# Patient Record
Sex: FEMALE | NOT HISPANIC OR LATINO
[De-identification: names, ages, dates, MRNs, and addresses within clinical notes are randomized per-mention and may not be internally consistent; named-entity substitution may affect disease eponyms.]

---

## 2017-07-27 ENCOUNTER — RESULT REVIEW (OUTPATIENT)
Age: 52
End: 2017-07-27

## 2017-07-28 ENCOUNTER — RESULT REVIEW (OUTPATIENT)
Age: 52
End: 2017-07-28

## 2018-08-09 ENCOUNTER — RESULT REVIEW (OUTPATIENT)
Age: 53
End: 2018-08-09

## 2018-10-04 ENCOUNTER — OUTPATIENT (OUTPATIENT)
Dept: OUTPATIENT SERVICES | Facility: HOSPITAL | Age: 53
LOS: 1 days | End: 2018-10-04
Payer: SELF-PAY

## 2018-10-04 VITALS
OXYGEN SATURATION: 99 % | TEMPERATURE: 98 F | DIASTOLIC BLOOD PRESSURE: 70 MMHG | RESPIRATION RATE: 14 BRPM | WEIGHT: 194.01 LBS | HEIGHT: 66.5 IN | SYSTOLIC BLOOD PRESSURE: 112 MMHG | HEART RATE: 59 BPM

## 2018-10-04 DIAGNOSIS — Z41.1 ENCOUNTER FOR COSMETIC SURGERY: ICD-10-CM

## 2018-10-04 DIAGNOSIS — E03.9 HYPOTHYROIDISM, UNSPECIFIED: ICD-10-CM

## 2018-10-04 DIAGNOSIS — Z90.710 ACQUIRED ABSENCE OF BOTH CERVIX AND UTERUS: Chronic | ICD-10-CM

## 2018-10-04 LAB
APPEARANCE UR: CLEAR — SIGNIFICANT CHANGE UP
BACTERIA # UR AUTO: NEGATIVE — SIGNIFICANT CHANGE UP
BILIRUB UR-MCNC: NEGATIVE — SIGNIFICANT CHANGE UP
BLD GP AB SCN SERPL QL: NEGATIVE — SIGNIFICANT CHANGE UP
BLOOD UR QL VISUAL: SIGNIFICANT CHANGE UP
BUN SERPL-MCNC: 14 MG/DL — SIGNIFICANT CHANGE UP (ref 7–23)
CALCIUM SERPL-MCNC: 9.5 MG/DL — SIGNIFICANT CHANGE UP (ref 8.4–10.5)
CHLORIDE SERPL-SCNC: 103 MMOL/L — SIGNIFICANT CHANGE UP (ref 98–107)
CO2 SERPL-SCNC: 28 MMOL/L — SIGNIFICANT CHANGE UP (ref 22–31)
COLOR SPEC: YELLOW — SIGNIFICANT CHANGE UP
CREAT SERPL-MCNC: 0.78 MG/DL — SIGNIFICANT CHANGE UP (ref 0.5–1.3)
GLUCOSE SERPL-MCNC: 103 MG/DL — HIGH (ref 70–99)
GLUCOSE UR-MCNC: NEGATIVE — SIGNIFICANT CHANGE UP
HCT VFR BLD CALC: 40.9 % — SIGNIFICANT CHANGE UP (ref 34.5–45)
HGB BLD-MCNC: 13.2 G/DL — SIGNIFICANT CHANGE UP (ref 11.5–15.5)
HYALINE CASTS # UR AUTO: SIGNIFICANT CHANGE UP
KETONES UR-MCNC: NEGATIVE — SIGNIFICANT CHANGE UP
LEUKOCYTE ESTERASE UR-ACNC: NEGATIVE — SIGNIFICANT CHANGE UP
MCHC RBC-ENTMCNC: 32.3 % — SIGNIFICANT CHANGE UP (ref 32–36)
MCHC RBC-ENTMCNC: 32.3 PG — SIGNIFICANT CHANGE UP (ref 27–34)
MCV RBC AUTO: 100 FL — SIGNIFICANT CHANGE UP (ref 80–100)
NITRITE UR-MCNC: NEGATIVE — SIGNIFICANT CHANGE UP
NRBC # FLD: 0 — SIGNIFICANT CHANGE UP
PH UR: 6 — SIGNIFICANT CHANGE UP (ref 5–8)
PLATELET # BLD AUTO: 187 K/UL — SIGNIFICANT CHANGE UP (ref 150–400)
PMV BLD: 12 FL — SIGNIFICANT CHANGE UP (ref 7–13)
POTASSIUM SERPL-MCNC: 3.4 MMOL/L — LOW (ref 3.5–5.3)
POTASSIUM SERPL-SCNC: 3.4 MMOL/L — LOW (ref 3.5–5.3)
PROT UR-MCNC: 20 — SIGNIFICANT CHANGE UP
RBC # BLD: 4.09 M/UL — SIGNIFICANT CHANGE UP (ref 3.8–5.2)
RBC # FLD: 12.1 % — SIGNIFICANT CHANGE UP (ref 10.3–14.5)
RBC CASTS # UR COMP ASSIST: HIGH (ref 0–?)
RH IG SCN BLD-IMP: POSITIVE — SIGNIFICANT CHANGE UP
SODIUM SERPL-SCNC: 142 MMOL/L — SIGNIFICANT CHANGE UP (ref 135–145)
SP GR SPEC: 1.03 — SIGNIFICANT CHANGE UP (ref 1–1.04)
SQUAMOUS # UR AUTO: SIGNIFICANT CHANGE UP
UROBILINOGEN FLD QL: SIGNIFICANT CHANGE UP
WBC # BLD: 5.07 K/UL — SIGNIFICANT CHANGE UP (ref 3.8–10.5)
WBC # FLD AUTO: 5.07 K/UL — SIGNIFICANT CHANGE UP (ref 3.8–10.5)
WBC UR QL: SIGNIFICANT CHANGE UP (ref 0–?)

## 2018-10-04 PROCEDURE — 93010 ELECTROCARDIOGRAM REPORT: CPT

## 2018-10-04 NOTE — H&P PST ADULT - NEGATIVE OPHTHALMOLOGIC SYMPTOMS
no diplopia/no discharge L/no photophobia/no pain R/no irritation R/no loss of vision L/no blurred vision L/no blurred vision R/no discharge R/no irritation L/no pain L

## 2018-10-04 NOTE — H&P PST ADULT - NEGATIVE MUSCULOSKELETAL SYMPTOMS
no muscle cramps/no arthritis/no neck pain/no leg pain R/no leg pain L/no stiffness/no arm pain L/no arm pain R/no joint swelling/no muscle weakness

## 2018-10-04 NOTE — H&P PST ADULT - PROBLEM SELECTOR PLAN 1
Patient is scheduled for  Abdominoplasty on 10/18/2018.  Preop instructions, pepcid, surgical scrub provided. Pt stated understanding.    Pending medical evaluation per surgeon, pending comparison EKG, patient has an appointment with Dr. Bruno 10/11/2018.   Pending Labs.

## 2018-10-04 NOTE — H&P PST ADULT - PROBLEM SELECTOR PLAN 2
Patient was instructed to take Synthroid and Liothyronine in the AM of surgery with a sip of water.   Pending last endocrinology note- ( Patient reports -Synthroid and Liothyronine dose changes every 6 months, depending on levels) Patient had an appointment on 9/2018.

## 2018-10-04 NOTE — H&P PST ADULT - NEGATIVE GENERAL GENITOURINARY SYMPTOMS
no flank pain L/normal urinary frequency/no nocturia/no flank pain R/no bladder infections/no urinary hesitancy/no dysuria

## 2018-10-04 NOTE — H&P PST ADULT - CONSTITUTIONAL DETAILS
obese/well-developed/no distress/well-groomed well-developed/well-nourished/well-groomed/no distress

## 2018-10-04 NOTE — H&P PST ADULT - NEGATIVE ENMT SYMPTOMS
no vertigo/no nasal discharge/no gum bleeding/no throat pain/no dysphagia/no tinnitus/no dry mouth/no ear pain/no nose bleeds/no hearing difficulty/no abnormal taste sensation no dysphagia/no abnormal taste sensation/no dry mouth/no nasal congestion/no gum bleeding/no throat pain/no vertigo/no tinnitus/no nasal discharge/no post-nasal discharge/no nose bleeds/no ear pain/no hearing difficulty

## 2018-10-04 NOTE — H&P PST ADULT - NEGATIVE GENERAL SYMPTOMS
no fatigue/no fever/no chills/no weight gain/no sweating/no polyphagia no polydipsia/no polyphagia/no polyuria/no fatigue/no weight gain/no fever/no chills/no sweating/no anorexia

## 2018-10-04 NOTE — H&P PST ADULT - PMH
Dyslipidemia    Encounter for cosmetic surgery    History of scarlet fever  as a child  Hypothyroidism    Obesity (BMI 30-39.9)    Torus palatinus    Uterine leiomyoma

## 2018-10-04 NOTE — H&P PST ADULT - MUSCULOSKELETAL
details… No joint pain, swelling or deformity; no limitation of movement detailed exam no calf tenderness/normal strength/no joint swelling/no joint erythema/no joint warmth/ROM intact

## 2018-10-04 NOTE — H&P PST ADULT - ENDOCRINE COMMENTS
h/o Hypothyroidism- Patient sees her endocrinologist every 6 months for an evaluation and adjustment of synthroid and Liothyronine

## 2018-10-04 NOTE — H&P PST ADULT - RS GEN PE MLT RESP DETAILS PC
no wheezes/breath sounds equal/respirations non-labored/airway patent/clear to auscultation bilaterally/no chest wall tenderness/no rhonchi/no intercostal retractions/no rales/good air movement no chest wall tenderness/respirations non-labored/no rales/no wheezes/breath sounds equal/airway patent/clear to auscultation bilaterally/no rhonchi/good air movement

## 2018-10-04 NOTE — H&P PST ADULT - LYMPHATIC
anterior cervical R/posterior cervical L/supraclavicular R/posterior cervical R/supraclavicular L/anterior cervical L

## 2018-10-04 NOTE — H&P PST ADULT - GASTROINTESTINAL DETAILS
nontender/bowel sounds normal no masses palpable/bowel sounds normal/soft/no distention/no guarding/nontender

## 2018-10-04 NOTE — H&P PST ADULT - NEGATIVE NEUROLOGICAL SYMPTOMS
no generalized seizures/no difficulty walking/no loss of sensation/no tremors/no facial palsy/no transient paralysis/no weakness/no paresthesias/no vertigo/no focal seizures/no syncope

## 2018-10-04 NOTE — H&P PST ADULT - ASSESSMENT
45 y/o female presents with PMH: uterine leiomyoma Encounter for cosmetic surgery, Patient is scheduled for  Abdominoplasty on 10/18/2018.

## 2018-10-04 NOTE — H&P PST ADULT - HISTORY OF PRESENT ILLNESS
52 year old female who presents to presurgical testing for an evaluation for a scheduled abdominoplasty scheduled for 10/18/2018 with Dr. Brenner.

## 2018-10-18 ENCOUNTER — INPATIENT (INPATIENT)
Facility: HOSPITAL | Age: 53
LOS: 0 days | Discharge: HOME | End: 2018-10-19
Attending: SURGERY | Admitting: SURGERY
Payer: SELF-PAY

## 2018-10-18 VITALS
TEMPERATURE: 98 F | DIASTOLIC BLOOD PRESSURE: 87 MMHG | OXYGEN SATURATION: 99 % | RESPIRATION RATE: 14 BRPM | HEART RATE: 56 BPM | WEIGHT: 194.01 LBS | HEIGHT: 66.5 IN | SYSTOLIC BLOOD PRESSURE: 130 MMHG

## 2018-10-18 DIAGNOSIS — Z90.710 ACQUIRED ABSENCE OF BOTH CERVIX AND UTERUS: Chronic | ICD-10-CM

## 2018-10-18 DIAGNOSIS — Z41.1 ENCOUNTER FOR COSMETIC SURGERY: ICD-10-CM

## 2018-10-18 LAB — RH IG SCN BLD-IMP: POSITIVE — SIGNIFICANT CHANGE UP

## 2018-10-18 PROCEDURE — 15830 EXC EXCESSIVE SKIN ABDOMEN: CPT

## 2018-10-18 RX ORDER — ACETAMINOPHEN 500 MG
975 TABLET ORAL EVERY 6 HOURS
Qty: 0 | Refills: 0 | Status: DISCONTINUED | OUTPATIENT
Start: 2018-10-19 | End: 2018-10-19

## 2018-10-18 RX ORDER — HYDROMORPHONE HYDROCHLORIDE 2 MG/ML
1 INJECTION INTRAMUSCULAR; INTRAVENOUS; SUBCUTANEOUS
Qty: 0 | Refills: 0 | Status: DISCONTINUED | OUTPATIENT
Start: 2018-10-18 | End: 2018-10-19

## 2018-10-18 RX ORDER — DIAZEPAM 5 MG
5 TABLET ORAL EVERY 6 HOURS
Qty: 0 | Refills: 0 | Status: DISCONTINUED | OUTPATIENT
Start: 2018-10-18 | End: 2018-10-19

## 2018-10-18 RX ORDER — ONDANSETRON 8 MG/1
4 TABLET, FILM COATED ORAL EVERY 6 HOURS
Qty: 0 | Refills: 0 | Status: DISCONTINUED | OUTPATIENT
Start: 2018-10-18 | End: 2018-10-19

## 2018-10-18 RX ORDER — SODIUM CHLORIDE 9 MG/ML
1000 INJECTION, SOLUTION INTRAVENOUS
Qty: 0 | Refills: 0 | Status: DISCONTINUED | OUTPATIENT
Start: 2018-10-18 | End: 2018-10-18

## 2018-10-18 RX ORDER — ONDANSETRON 8 MG/1
4 TABLET, FILM COATED ORAL ONCE
Qty: 0 | Refills: 0 | Status: DISCONTINUED | OUTPATIENT
Start: 2018-10-18 | End: 2018-10-18

## 2018-10-18 RX ORDER — ACETAMINOPHEN 500 MG
1000 TABLET ORAL ONCE
Qty: 0 | Refills: 0 | Status: COMPLETED | OUTPATIENT
Start: 2018-10-19 | End: 2018-10-19

## 2018-10-18 RX ORDER — SODIUM CHLORIDE 9 MG/ML
1000 INJECTION, SOLUTION INTRAVENOUS
Qty: 0 | Refills: 0 | Status: DISCONTINUED | OUTPATIENT
Start: 2018-10-18 | End: 2018-10-19

## 2018-10-18 RX ORDER — CEFAZOLIN SODIUM 1 G
1000 VIAL (EA) INJECTION EVERY 8 HOURS
Qty: 0 | Refills: 0 | Status: DISCONTINUED | OUTPATIENT
Start: 2018-10-18 | End: 2018-10-19

## 2018-10-18 RX ORDER — OXYCODONE HYDROCHLORIDE 5 MG/1
5 TABLET ORAL EVERY 4 HOURS
Qty: 0 | Refills: 0 | Status: DISCONTINUED | OUTPATIENT
Start: 2018-10-18 | End: 2018-10-19

## 2018-10-18 RX ORDER — ENOXAPARIN SODIUM 100 MG/ML
40 INJECTION SUBCUTANEOUS EVERY 24 HOURS
Qty: 0 | Refills: 0 | Status: DISCONTINUED | OUTPATIENT
Start: 2018-10-19 | End: 2018-10-19

## 2018-10-18 RX ORDER — LABETALOL HCL 100 MG
10 TABLET ORAL EVERY 6 HOURS
Qty: 0 | Refills: 0 | Status: DISCONTINUED | OUTPATIENT
Start: 2018-10-18 | End: 2018-10-19

## 2018-10-18 RX ORDER — LEVOTHYROXINE SODIUM 125 MCG
1 TABLET ORAL
Qty: 0 | Refills: 0 | COMMUNITY

## 2018-10-18 RX ORDER — LIOTHYRONINE SODIUM 25 UG/1
1 TABLET ORAL
Qty: 0 | Refills: 0 | COMMUNITY

## 2018-10-18 RX ORDER — OXYCODONE HYDROCHLORIDE 5 MG/1
10 TABLET ORAL EVERY 4 HOURS
Qty: 0 | Refills: 0 | Status: DISCONTINUED | OUTPATIENT
Start: 2018-10-18 | End: 2018-10-19

## 2018-10-18 RX ORDER — ATORVASTATIN CALCIUM 80 MG/1
1 TABLET, FILM COATED ORAL
Qty: 0 | Refills: 0 | COMMUNITY

## 2018-10-18 RX ADMIN — SODIUM CHLORIDE 50 MILLILITER(S): 9 INJECTION, SOLUTION INTRAVENOUS at 23:39

## 2018-10-18 NOTE — BRIEF OPERATIVE NOTE - PROCEDURE
<<-----Click on this checkbox to enter Procedure Abdominoplasty with liposuction  10/18/2018    Active  ADRIANAS

## 2018-10-19 ENCOUNTER — TRANSCRIPTION ENCOUNTER (OUTPATIENT)
Age: 53
End: 2018-10-19

## 2018-10-19 VITALS
OXYGEN SATURATION: 97 % | RESPIRATION RATE: 16 BRPM | DIASTOLIC BLOOD PRESSURE: 66 MMHG | SYSTOLIC BLOOD PRESSURE: 112 MMHG | TEMPERATURE: 99 F | HEART RATE: 73 BPM

## 2018-10-19 RX ORDER — CEPHALEXIN 500 MG
1 CAPSULE ORAL
Qty: 0 | Refills: 0 | COMMUNITY

## 2018-10-19 RX ORDER — ENOXAPARIN SODIUM 100 MG/ML
40 INJECTION SUBCUTANEOUS
Qty: 5 | Refills: 0 | OUTPATIENT
Start: 2018-10-19 | End: 2018-10-23

## 2018-10-19 RX ORDER — ENOXAPARIN SODIUM 100 MG/ML
40 INJECTION SUBCUTANEOUS
Qty: 2 | Refills: 0 | OUTPATIENT
Start: 2018-10-19 | End: 2018-10-23

## 2018-10-19 RX ORDER — DIAZEPAM 5 MG
1 TABLET ORAL
Qty: 0 | Refills: 0 | COMMUNITY
Start: 2018-10-19

## 2018-10-19 RX ADMIN — ENOXAPARIN SODIUM 40 MILLIGRAM(S): 100 INJECTION SUBCUTANEOUS at 05:52

## 2018-10-19 RX ADMIN — Medication 975 MILLIGRAM(S): at 15:28

## 2018-10-19 RX ADMIN — Medication 400 MILLIGRAM(S): at 04:00

## 2018-10-19 RX ADMIN — OXYCODONE HYDROCHLORIDE 10 MILLIGRAM(S): 5 TABLET ORAL at 01:43

## 2018-10-19 RX ADMIN — Medication 100 MILLIGRAM(S): at 14:04

## 2018-10-19 RX ADMIN — Medication 5 MILLIGRAM(S): at 15:28

## 2018-10-19 RX ADMIN — Medication 5 MILLIGRAM(S): at 00:36

## 2018-10-19 RX ADMIN — HYDROMORPHONE HYDROCHLORIDE 1 MILLIGRAM(S): 2 INJECTION INTRAMUSCULAR; INTRAVENOUS; SUBCUTANEOUS at 00:37

## 2018-10-19 RX ADMIN — OXYCODONE HYDROCHLORIDE 10 MILLIGRAM(S): 5 TABLET ORAL at 02:43

## 2018-10-19 RX ADMIN — Medication 975 MILLIGRAM(S): at 09:00

## 2018-10-19 RX ADMIN — OXYCODONE HYDROCHLORIDE 10 MILLIGRAM(S): 5 TABLET ORAL at 17:46

## 2018-10-19 RX ADMIN — HYDROMORPHONE HYDROCHLORIDE 1 MILLIGRAM(S): 2 INJECTION INTRAMUSCULAR; INTRAVENOUS; SUBCUTANEOUS at 00:58

## 2018-10-19 RX ADMIN — OXYCODONE HYDROCHLORIDE 5 MILLIGRAM(S): 5 TABLET ORAL at 09:06

## 2018-10-19 RX ADMIN — Medication 100 MILLIGRAM(S): at 05:52

## 2018-10-19 RX ADMIN — Medication 975 MILLIGRAM(S): at 09:01

## 2018-10-19 RX ADMIN — OXYCODONE HYDROCHLORIDE 5 MILLIGRAM(S): 5 TABLET ORAL at 14:30

## 2018-10-19 RX ADMIN — OXYCODONE HYDROCHLORIDE 10 MILLIGRAM(S): 5 TABLET ORAL at 17:33

## 2018-10-19 RX ADMIN — OXYCODONE HYDROCHLORIDE 5 MILLIGRAM(S): 5 TABLET ORAL at 10:10

## 2018-10-19 RX ADMIN — OXYCODONE HYDROCHLORIDE 5 MILLIGRAM(S): 5 TABLET ORAL at 13:16

## 2018-10-19 RX ADMIN — Medication 5 MILLIGRAM(S): at 09:38

## 2018-10-19 NOTE — DISCHARGE NOTE ADULT - NS AS DC FOLLOWUP STROKE INST
carenotes on surgical procedure, d/c medications carenotes on surgical procedure, d/c medications NOHEMI drains

## 2018-10-19 NOTE — DISCHARGE NOTE ADULT - HOSPITAL COURSE
52y Female was admitted to [facility] on 10-18-18. The patient had a diastasis recti and required a  Abdominoplasty with liposuction   to be performed in the OR. Post operative the patient was sent to the PACU, the patient was hemodynamically stable and sent to the floor. The patient's pain was controlled by IV pain medications transitioned to po narcotics. The patient was advanced to regular diet and tolerated it well. The patient was hemodynamically stable and placed on home medications. The patient was told to follow up with Dr. WOLF Brenner in 1-2 weeks and had no other issues.

## 2018-10-19 NOTE — DISCHARGE NOTE ADULT - CARE PLAN
Principal Discharge DX:	Encounter for cosmetic surgery  Goal:	promotion of healing  Assessment and plan of treatment:	DRAINS: You will be discharged with NOHEMI drains. You will need to empty them and record outputs accurately. This will be taught to you by the nursing staff. Please do not remove the NOHEMI drains. They will be removed in the office. Please bring to the office accurate records of output.     Your abdomen will feel tight, please walk hunched in the beginning and continue wearing your abdominal binder. keep your dressing on until follow up.

## 2018-10-19 NOTE — DISCHARGE NOTE ADULT - MEDICATION SUMMARY - MEDICATIONS TO TAKE
I will START or STAY ON the medications listed below when I get home from the hospital:    Norco 5 mg-325 mg oral tablet  -- 1 tab(s) by mouth every 6 hours  -- Indication: For pain    diazePAM 5 mg oral tablet  -- 1 tab(s) by mouth every 6 hours, As needed, Muscle Spasm Pain  -- Indication: For muscle spasm    atorvastatin 20 mg oral tablet  -- 1 tab(s) by mouth once a day  -- Indication: For Home med    cephalexin 500 mg oral capsule  -- 1 cap(s) by mouth every 12 hours  -- Indication: For abdominoplasty abx    liothyronine 5 mcg oral tablet  -- 1 tab(s) by mouth once a day  -- Indication: For Home med    Synthroid 100 mcg (0.1 mg) oral tablet  -- 1 tab(s) by mouth once a day  -- Indication: For Home med I will START or STAY ON the medications listed below when I get home from the hospital:    Norco 5 mg-325 mg oral tablet  -- 1 tab(s) by mouth every 6 hours  -- Indication: For pain    enoxaparin 40 mg/0.4 mL injectable solution  -- 40 milligram(s) subcutaneously once a day   -- It is very important that you take or use this exactly as directed.  Do not skip doses or discontinue unless directed by your doctor.    -- Indication: For DVT ppx    diazePAM 5 mg oral tablet  -- 1 tab(s) by mouth every 6 hours, As needed, Muscle Spasm Pain  -- Indication: For muscle spasm    atorvastatin 20 mg oral tablet  -- 1 tab(s) by mouth once a day  -- Indication: For Home med    cephalexin 500 mg oral capsule  -- 1 cap(s) by mouth every 12 hours  -- Indication: For abdominoplasty abx    liothyronine 5 mcg oral tablet  -- 1 tab(s) by mouth once a day  -- Indication: For Home med    Synthroid 100 mcg (0.1 mg) oral tablet  -- 1 tab(s) by mouth once a day  -- Indication: For Home med

## 2018-10-19 NOTE — PROGRESS NOTE ADULT - SUBJECTIVE AND OBJECTIVE BOX
ANESTHESIA POSTOP CHECK    52y Female POSTOP DAY 1     No COMPLAINTS    NO APPARENT ANESTHESIA COMPLICATIONS

## 2018-10-19 NOTE — DISCHARGE NOTE ADULT - CONDITIONS AT DISCHARGE
stable, abdominal binder in place, 3 abdominal JPs to ss in place with serosang drainage, tolerating diet, voiding well, oob with assist

## 2018-10-19 NOTE — DISCHARGE NOTE ADULT - PLAN OF CARE
promotion of healing DRAINS: You will be discharged with NOHEMI drains. You will need to empty them and record outputs accurately. This will be taught to you by the nursing staff. Please do not remove the NOHEMI drains. They will be removed in the office. Please bring to the office accurate records of output.     Your abdomen will feel tight, please walk hunched in the beginning and continue wearing your abdominal binder. keep your dressing on until follow up.

## 2018-10-19 NOTE — DISCHARGE NOTE ADULT - CARE PROVIDER_API CALL
Lenny Brenner (MD), Plastic Surgery  [address]  [address]  [place] [address]  Phone: [phone]  Fax: [phone]

## 2018-10-19 NOTE — PROGRESS NOTE ADULT - SUBJECTIVE AND OBJECTIVE BOX
Plastic Surgery  Daily Progress Note     Subjective/24 Hour Events: No acute events overnight, pt doing well this morning    Objective:    Vitals:  T(C): 37.1 (10-19-18 @ 09:34), Max: 37.1 (10-19-18 @ 09:34)  HR: 70 (10-19-18 @ 09:34) (56 - 79)  BP: 110/60 (10-19-18 @ 09:34) (99/59 - 130/87)  RR: 16 (10-19-18 @ 09:34) (11 - 21)  SpO2: 100% (10-19-18 @ 09:34) (94% - 100%)    I/O:     10-18-18 @ 07:01  -  10-19-18 @ 07:00  --------------------------------------------------------  IN: 175 mL / OUT: 510 mL / NET: -335 mL    10-19-18 @ 07:01  -  10-19-18 @ 11:53  --------------------------------------------------------  IN: 0 mL / OUT: 47.5 mL / NET: -47.5 mL      Physical Exam:   - General: NAD  - Abd: Binder in place. [place] in place, C/D/I, drains in place

## 2018-10-19 NOTE — DISCHARGE NOTE ADULT - PATIENT PORTAL LINK FT
You can access the [email or web address] Patient Portal, offered by [facility], by registering with the following website: [email or web address]

## 2018-10-19 NOTE — DISCHARGE NOTE ADULT - INSTRUCTIONS
Notify Dr Brenner if you experience any increase in pain not relieved with pain medication, any redness,  drainage or swelling around incision or any fever >100.5.  drink plenty of fluids. No heavy lifting or straining. Take over the counter stool softeners to assist with constipation which can be a side effect of your pain medication. Notify Dr Brenner if you experience any increase in pain not relieved with pain medication, any redness,  drainage or swelling around incision or any fever >100.5.  drink plenty of fluids. No heavy lifting or straining. Take over the counter stool softeners to assist with constipation which can be a side effect of your pain medication.  Empty NOHEMI drains and record as instructed. Notify Dr Brenner if you experience any increase in pain not relieved with pain medication, any redness,  drainage or swelling around incision or any fever >100.5.  drink plenty of fluids. No heavy lifting or straining. Take over the counter stool softeners to assist with constipation which can be a side effect of your pain medication.  Empty NOHEMI drains and record as instructed. Lovenox as per instructions

## 2018-10-19 NOTE — PROGRESS NOTE ADULT - ASSESSMENT
51 y/o F s/p Abdominoplasty w/ Liposuction of b/l flanks (POD #1)    - Remove Velazquez  - Void check  - Plan to d/c home later today

## 2019-02-12 ENCOUNTER — RESULT REVIEW (OUTPATIENT)
Age: 54
End: 2019-02-12

## 2019-03-05 PROBLEM — Z41.1 ENCOUNTER FOR COSMETIC SURGERY: Chronic | Status: ACTIVE | Noted: 2018-10-04

## 2019-03-17 ENCOUNTER — LABORATORY RESULT (OUTPATIENT)
Age: 54
End: 2019-03-17

## 2019-03-18 ENCOUNTER — APPOINTMENT (OUTPATIENT)
Dept: DERMATOLOGY | Facility: CLINIC | Age: 54
End: 2019-03-18
Payer: COMMERCIAL

## 2019-03-18 VITALS — SYSTOLIC BLOOD PRESSURE: 110 MMHG | DIASTOLIC BLOOD PRESSURE: 70 MMHG

## 2019-03-18 DIAGNOSIS — Z91.89 OTHER SPECIFIED PERSONAL RISK FACTORS, NOT ELSEWHERE CLASSIFIED: ICD-10-CM

## 2019-03-18 DIAGNOSIS — D48.5 NEOPLASM OF UNCERTAIN BEHAVIOR OF SKIN: ICD-10-CM

## 2019-03-18 PROCEDURE — 11104 PUNCH BX SKIN SINGLE LESION: CPT

## 2019-03-18 PROCEDURE — 99203 OFFICE O/P NEW LOW 30 MIN: CPT | Mod: 25

## 2019-04-01 ENCOUNTER — APPOINTMENT (OUTPATIENT)
Dept: DERMATOLOGY | Facility: CLINIC | Age: 54
End: 2019-04-01
Payer: COMMERCIAL

## 2019-04-01 PROCEDURE — 99213 OFFICE O/P EST LOW 20 MIN: CPT

## 2019-04-01 RX ORDER — KETOCONAZOLE 20.5 MG/ML
2 SHAMPOO, SUSPENSION TOPICAL
Qty: 1 | Refills: 11 | Status: ACTIVE | COMMUNITY
Start: 2019-04-01 | End: 1900-01-01

## 2019-04-01 RX ORDER — HYDROCORTISONE 25 MG/G
2.5 CREAM TOPICAL
Qty: 1 | Refills: 3 | Status: ACTIVE | COMMUNITY
Start: 2019-04-01 | End: 1900-01-01

## 2019-05-13 ENCOUNTER — APPOINTMENT (OUTPATIENT)
Dept: DERMATOLOGY | Facility: CLINIC | Age: 54
End: 2019-05-13
Payer: COMMERCIAL

## 2019-05-13 DIAGNOSIS — L81.9 DISORDER OF PIGMENTATION, UNSPECIFIED: ICD-10-CM

## 2019-05-13 PROCEDURE — 99213 OFFICE O/P EST LOW 20 MIN: CPT

## 2019-05-13 RX ORDER — HYDROQUINONE 40 MG/G
4 CREAM TOPICAL
Qty: 1 | Refills: 4 | Status: ACTIVE | COMMUNITY
Start: 2019-05-13 | End: 1900-01-01

## 2019-09-16 ENCOUNTER — APPOINTMENT (OUTPATIENT)
Dept: DERMATOLOGY | Facility: CLINIC | Age: 54
End: 2019-09-16

## 2019-09-26 ENCOUNTER — RESULT REVIEW (OUTPATIENT)
Age: 54
End: 2019-09-26

## 2020-09-16 ENCOUNTER — APPOINTMENT (OUTPATIENT)
Dept: ORTHOPEDIC SURGERY | Facility: CLINIC | Age: 55
End: 2020-09-16
Payer: COMMERCIAL

## 2020-09-16 VITALS
WEIGHT: 163 LBS | SYSTOLIC BLOOD PRESSURE: 120 MMHG | HEIGHT: 68 IN | BODY MASS INDEX: 24.71 KG/M2 | DIASTOLIC BLOOD PRESSURE: 77 MMHG | HEART RATE: 14 BPM

## 2020-09-16 VITALS — TEMPERATURE: 97.6 F

## 2020-09-16 DIAGNOSIS — M25.562 PAIN IN LEFT KNEE: ICD-10-CM

## 2020-09-16 DIAGNOSIS — G89.29 PAIN IN LEFT KNEE: ICD-10-CM

## 2020-09-16 PROCEDURE — 99204 OFFICE O/P NEW MOD 45 MIN: CPT

## 2020-09-16 PROCEDURE — 73564 X-RAY EXAM KNEE 4 OR MORE: CPT | Mod: LT

## 2020-09-16 RX ORDER — HYALURONATE SODIUM 20 MG/2 ML
20 SYRINGE (ML) INTRAARTICULAR
Qty: 3 | Refills: 0 | Status: ACTIVE | COMMUNITY
Start: 2020-09-16

## 2020-09-17 ENCOUNTER — FORM ENCOUNTER (OUTPATIENT)
Age: 55
End: 2020-09-17

## 2020-11-17 ENCOUNTER — APPOINTMENT (OUTPATIENT)
Dept: ORTHOPEDIC SURGERY | Facility: CLINIC | Age: 55
End: 2020-11-17
Payer: COMMERCIAL

## 2020-11-17 VITALS — TEMPERATURE: 98.1 F

## 2020-11-17 PROCEDURE — 99213 OFFICE O/P EST LOW 20 MIN: CPT | Mod: 25

## 2020-11-17 PROCEDURE — 20610 DRAIN/INJ JOINT/BURSA W/O US: CPT | Mod: LT

## 2020-11-17 NOTE — PHYSICAL EXAM
[de-identified] : Patient is well nourished, well-developed, in no acute distress, with appropriate mood and affect. The patient is oriented to time, place, and person. Respirations are even and unlabored.  Gait evaluation does reveal a limp. There is no inguinal adenopathy. Examination of the contralateral knee shows normal range of motion, strength, no tenderness, and intact skin. The affected limb is well-perfused, without skin lesions, shows a grossly normal motor and sensory examination. Knee motion causes significant pain and is tender to palpation in the medial joint line. There is no tenderness to palpation in the lateral side of the knee. The knee moves from 0 to 125 degrees. The knee is stable within that range-of-motion to AP and ML stress. The alignment of the knee is 5 degrees varus. Patella grind is negative. Muscle strength is normal. Pedal pulses are palpable. Hip examination was negative.

## 2020-11-17 NOTE — HISTORY OF PRESENT ILLNESS
[de-identified] : This is a very nice 55 I have previously diagnosed her with mild left knee degenerative joint disease.  Year old woman experiencing left knee pain x several years, moderate to severe in intensity. The pain is exacerbated by standing, walking, negotiating stairs.  Medications she has tried include: ibuprofen, Advil and Aleve and meloxicam with the most relief coming from Advil.  She has not tried using a Brace. She has not tried physical therapy. She ambulates without assistive device.  She has had a prior injection of cortisone in late February 2020, which provided lonely 2 weeks relief.  She denies numbness and tingling in the extremity.  She does not feel clicking / locking / catching. The pain substantially limits activities of daily living. Walking tolerance is reduced. \par \par

## 2020-11-17 NOTE — DISCUSSION/SUMMARY
[de-identified] : This patient has left knee pain secondary to mild degenerative changes in the medial compartment of the left knee.  Today we initiated a course of Euflexxa injections.\par \par Injection: Left knee joint.\par Indication: Osteoarthritis.\par \par A discussion was had with the patient regarding this procedure and all questions were answered. All risks, benefits and alternatives were discussed. These included but were not limited to bleeding, infection, and allergic reaction. Alcohol was used to clean the skin, and betadine was used to sterilize and prep the area in the anteromedial aspect of the knee. Ethyl chloride spray was then used as a topical anesthetic. A 22-gauge needle was used to inject 2 cc of Euflexxa into the knee with ease. A sterile bandage was then applied. The patient tolerated the procedure well and there were no complications. \par \par Lot #: [id number]\par Exp: 2021-08-30\par \par The first of three Euflexxa injections was given today under sterile conditions into the left knee joint without complication (see procedure note). I again discussed the role of activity modification/icing following the injection to treat any local irritation from the injection. \par \par I will have the patient followup for the next injection in approximately 1 week.\par

## 2020-11-24 ENCOUNTER — APPOINTMENT (OUTPATIENT)
Dept: ORTHOPEDIC SURGERY | Facility: CLINIC | Age: 55
End: 2020-11-24
Payer: COMMERCIAL

## 2020-11-24 VITALS — TEMPERATURE: 97.3 F

## 2020-11-24 PROCEDURE — 20610 DRAIN/INJ JOINT/BURSA W/O US: CPT | Mod: LT

## 2020-11-24 NOTE — PROCEDURE
[de-identified] : Injection: Left knee joint.\par Indication: Osteoarthritis.\par \par A discussion was had with the patient regarding this procedure and all questions were answered. All risks, benefits and alternatives were discussed. These included but were not limited to bleeding, infection, and allergic reaction. Alcohol was used to clean the skin, and betadine was used to sterilize and prep the area in the anteromedial aspect of the knee. Ethyl chloride spray was then used as a topical anesthetic. A 22-gauge needle was used to inject 2 cc of Euflexxa into the knee with ease. A sterile bandage was then applied. The patient tolerated the procedure well and there were no complications. \par \par Lot #: [id number]\par Exp: 2021-08-30\par \par The second of three Euflexxa injections was given today under sterile conditions into the left knee joint without complication (see procedure note). I again discussed the role of activity modification/icing following the injection to treat any local irritation from the injection. \par \par I will have the patient followup for the next injection in approximately 1 week.\par  \par

## 2020-12-01 ENCOUNTER — APPOINTMENT (OUTPATIENT)
Dept: ORTHOPEDIC SURGERY | Facility: CLINIC | Age: 55
End: 2020-12-01
Payer: COMMERCIAL

## 2020-12-01 VITALS — TEMPERATURE: 97.1 F

## 2020-12-01 PROCEDURE — 99072 ADDL SUPL MATRL&STAF TM PHE: CPT

## 2020-12-01 PROCEDURE — 20610 DRAIN/INJ JOINT/BURSA W/O US: CPT | Mod: LT

## 2020-12-01 NOTE — PROCEDURE
[de-identified] : Injection: Left knee joint.\par Indication: Osteoarthritis.\par \par A discussion was had with the patient regarding this procedure and all questions were answered. All risks, benefits and alternatives were discussed. These included but were not limited to bleeding, infection, and allergic reaction. Alcohol was used to clean the skin, and betadine was used to sterilize and prep the area in the anteromedial aspect of the knee. Ethyl chloride spray was then used as a topical anesthetic. A 22-gauge needle was used to inject 2 cc of Euflexxa into the knee with ease. A sterile bandage was then applied. The patient tolerated the procedure well and there were no complications. \par \par Lot #: [id number]\par Exp: 2021-08-30\par \par The 3 of three Euflexxa injections was given today under sterile conditions into the left knee joint without complication (see procedure note). I again discussed the role of activity modification/icing following the injection to treat any local irritation from the injection. \par \par I will have the patient followup as needed\par  \par

## 2021-02-11 ENCOUNTER — APPOINTMENT (OUTPATIENT)
Dept: ORTHOPEDIC SURGERY | Facility: CLINIC | Age: 56
End: 2021-02-11
Payer: COMMERCIAL

## 2021-02-11 VITALS — TEMPERATURE: 97.3 F

## 2021-02-11 VITALS
SYSTOLIC BLOOD PRESSURE: 128 MMHG | BODY MASS INDEX: 24.25 KG/M2 | WEIGHT: 160 LBS | DIASTOLIC BLOOD PRESSURE: 69 MMHG | HEIGHT: 68 IN | HEART RATE: 69 BPM

## 2021-02-11 DIAGNOSIS — M84.453A PATHOLOGICAL FRACTURE, UNSPECIFIED FEMUR, INITIAL ENCOUNTER FOR FRACTURE: ICD-10-CM

## 2021-02-11 PROCEDURE — 99214 OFFICE O/P EST MOD 30 MIN: CPT | Mod: 25

## 2021-02-11 PROCEDURE — 20610 DRAIN/INJ JOINT/BURSA W/O US: CPT | Mod: LT

## 2021-02-11 PROCEDURE — 99072 ADDL SUPL MATRL&STAF TM PHE: CPT

## 2021-02-11 NOTE — DISCUSSION/SUMMARY
[de-identified] : This patient has left knee pain secondary to mild degenerative changes in the medial compartment of the left knee secondary to sonk.  The patient is not an appropriate candidate for surgical intervention at this time. An extensive discussion was conducted on the natural history of the disease and the variety of surgical and non-surgical options available to the patient including, but not limited to non-steroidal anti-inflammatory medications, steroid injections, physical therapy, maintenance of ideal body weight, and reduction of activity.  Today we performed a left knee intra-articular cortisone injection.  In physical therapy.  Continue meloxicam. The patient will schedule an appointment as needed.\par \par Informed consent for the left knee injection was obtained. All questions were answered. A time out was performed. The left knee was prepped and draped in sterile fashion. Using sterile technique, the left knee was injected with 2cc of Kenalog, 4cc of 1% lidocaine, 4cc of 0.25% marcaine using a 21-gauge needle. A sterile dressing was applied. Post injection instructions were reviewed. The patient tolerated the procedure well.\par

## 2021-02-11 NOTE — HISTORY OF PRESENT ILLNESS
[de-identified] : This is a very nice 55-year-old female who is experiencing left knee pain for more than several years which is now severe in intensity.  I have previously diagnosed her with mild left knee degenerative joint disease secondary to SONK.  The pain improved after series of Euflexxa injections but suddenly the pain has recurred approximately 7 weeks later now.  Improves with meloxicam.  The pain is exacerbated by standing, walking, negotiating stairs.  Medications she has tried include: Meloxicam which helps.  She has not tried using a Brace. She has not tried physical therapy. She ambulates without assistive device.  She has had a prior injection of cortisone in late February 2020, which provided lonely 2 weeks relief.  She denies numbness and tingling in the extremity.  She does not feel clicking / locking / catching. The pain substantially limits activities of daily living. Walking tolerance is reduced. \par \par

## 2021-04-13 ENCOUNTER — APPOINTMENT (OUTPATIENT)
Dept: ORTHOPEDIC SURGERY | Facility: CLINIC | Age: 56
End: 2021-04-13

## 2021-08-25 DIAGNOSIS — Z00.00 ENCOUNTER FOR GENERAL ADULT MEDICAL EXAMINATION W/OUT ABNORMAL FINDINGS: ICD-10-CM

## 2021-08-27 ENCOUNTER — APPOINTMENT (OUTPATIENT)
Dept: ORTHOPEDIC SURGERY | Facility: CLINIC | Age: 56
End: 2021-08-27
Payer: COMMERCIAL

## 2021-08-27 PROCEDURE — 73564 X-RAY EXAM KNEE 4 OR MORE: CPT | Mod: LT

## 2021-08-27 PROCEDURE — 99214 OFFICE O/P EST MOD 30 MIN: CPT

## 2021-08-27 NOTE — PHYSICAL EXAM
[de-identified] : Patient is well nourished, well-developed, in no acute distress, with appropriate mood and affect. The patient is oriented to time, place, and person. Respirations are even and unlabored.  Gait evaluation does reveal a limp. There is no inguinal adenopathy. Examination of the contralateral knee shows normal range of motion, strength, no tenderness, and intact skin. The affected limb is well-perfused, without skin lesions, shows a grossly normal motor and sensory examination. Knee motion causes significant pain and is tender to palpation in the medial joint line. There is no tenderness to palpation in the lateral side of the knee. The knee moves from 0 to 125 degrees. The knee is stable within that range-of-motion to AP and ML stress. The alignment of the knee is 5 degrees varus. Patella grind is negative. Muscle strength is normal. Pedal pulses are palpable. Hip examination was negative. [de-identified] : Long standing knee, AP knee, lateral knee, and patellar views of the left knee were ordered and taken in the office and demonstrate no evidence of degenerative joint disease of the knee, fractures, intra-articular pathology.

## 2021-08-27 NOTE — DISCUSSION/SUMMARY
[de-identified] : This patient has left knee pain secondary to mild degenerative changes in the medial compartment of the left knee secondary to sonk.  The patient is not an appropriate candidate for surgical intervention at this time. An extensive discussion was conducted on the natural history of the disease and the variety of surgical and non-surgical options available to the patient including, but not limited to non-steroidal anti-inflammatory medications, steroid injections, physical therapy, maintenance of ideal body weight, and reduction of activity.  Meloxicam prescribed.  I will place an insurance company for preauthorization for Zilretta injection.  Follow-up with Zilretta is authorized.

## 2021-08-27 NOTE — REASON FOR VISIT
[Osteoarthritis, Knee] : osteoarthritis, knee [Follow-Up Visit] : a follow-up visit for [Knee Pain] : knee pain

## 2021-08-27 NOTE — HISTORY OF PRESENT ILLNESS
[de-identified] : This is a very nice 55-year-old female who is experiencing left knee pain for more than several years which is now severe in intensity.  I have previously diagnosed her with mild left knee degenerative joint disease secondary to SONK.  The pain improved after series of Euflexxa injections but suddenly the pain has recurred approximately 7 weeks later now.  Improves with meloxicam.  The pain is exacerbated by standing, walking, negotiating stairs.  Medications she has tried include: Meloxicam which helps.  She has not tried using a Brace. She has not tried physical therapy. She ambulates without assistive device.  She has had a prior injection of cortisone in late February 2020, which provided lonely 2 weeks relief.  She denies numbness and tingling in the extremity.  She does not feel clicking / locking / catching. The pain substantially limits activities of daily living. Walking tolerance is reduced. \par \par

## 2021-09-14 ENCOUNTER — APPOINTMENT (OUTPATIENT)
Dept: ORTHOPEDIC SURGERY | Facility: CLINIC | Age: 56
End: 2021-09-14
Payer: COMMERCIAL

## 2021-09-14 DIAGNOSIS — M17.12 UNILATERAL PRIMARY OSTEOARTHRITIS, LEFT KNEE: ICD-10-CM

## 2021-09-14 PROCEDURE — 20610 DRAIN/INJ JOINT/BURSA W/O US: CPT

## 2021-09-14 RX ORDER — MELOXICAM 15 MG/1
15 TABLET ORAL
Qty: 30 | Refills: 2 | Status: ACTIVE | COMMUNITY
Start: 2021-08-27 | End: 1900-01-01

## 2021-09-14 NOTE — PROCEDURE
[de-identified] : Injection: Left knee joint.\par Indication: Osteoarthritis.\par \par A discussion was had with the patient regarding this procedure and all questions were answered. All risks, benefits and alternatives were discussed. These included but were not limited to bleeding, infection, and allergic reaction. Alcohol was used to clean the skin, and betadine was used to sterilize and prep the area in the anteromedial aspect of the knee. Ethyl chloride spray was then used as a topical anesthetic. A 21-gauge needle was used to inject 32 units of Zilretta and 4cc of 0.25% marcaine into the knee with ease. A sterile bandage was then applied. The patient tolerated the procedure well and there were no complications. \par \par Lot #: [id number]\par Exp: November 2021\par \par The single injection of Zilretta was given today under sterile conditions into the left knee joint without complication (see procedure note). I again discussed the role of activity modification/icing following the injection to treat any local irritation from the injection. \par \par I recommended to the patient to continue with the conservative methods previously documented as well as to follow up with me in the office in 4-6 months for another evaluation. \par

## 2022-01-24 ENCOUNTER — RESULT REVIEW (OUTPATIENT)
Age: 57
End: 2022-01-24

## 2022-02-25 NOTE — H&P PST ADULT - HOW PATIENT ADDRESSED, PROFILE
.Follow up with your primary MD 1 to 3 days for wellness check. Culture is sent out it takes a minimum of 3 days and maximum of 7 days for bacteria to grow in culture. Follow up with medical records and your primary care physician to review test results. IF TEST RESULT IS POSITIVE YOU WILL NEED TO RETURN TO THE HOSPITAL FOR TREATMENT. No sexual intercourse or oral sex in 7 days Notify any affected partners that you were treated for sexually transmitted disease and that they should seek medical attention. . Please practice safe sex use condoms. Further sexually transmitted infection testing should be completed at his PCP's office for diseases such as syphilis or HIV. Return to the ED if needing immediate medical attention or anymore concerns.     
Chana

## 2022-03-07 NOTE — PHYSICAL EXAM
[de-identified] : Patient is well nourished, well-developed, in no acute distress, with appropriate mood and affect. The patient is oriented to time, place, and person. Respirations are even and unlabored.  Gait evaluation does reveal a limp. There is no inguinal adenopathy. Examination of the contralateral knee shows normal range of motion, strength, no tenderness, and intact skin. The affected limb is well-perfused, without skin lesions, shows a grossly normal motor and sensory examination. Knee motion causes significant pain and is tender to palpation in the medial joint line. There is no tenderness to palpation in the lateral side of the knee. The knee moves from 0 to 125 degrees. The knee is stable within that range-of-motion to AP and ML stress. The alignment of the knee is 5 degrees varus. Patella grind is negative. Muscle strength is normal. Pedal pulses are palpable. Hip examination was negative.
posterior

## 2022-06-06 NOTE — H&P PST ADULT - NEGATIVE BREAST SYMPTOMS
n/a no nipple discharge R/no breast lump L/no breast tenderness R/no breast lump R/no nipple discharge L/no breast tenderness L

## 2022-07-25 ENCOUNTER — RESULT REVIEW (OUTPATIENT)
Age: 57
End: 2022-07-25

## 2022-07-27 ENCOUNTER — APPOINTMENT (OUTPATIENT)
Dept: UROLOGY | Facility: CLINIC | Age: 57
End: 2022-07-27

## 2022-07-27 VITALS
RESPIRATION RATE: 16 BRPM | HEIGHT: 67 IN | BODY MASS INDEX: 24.96 KG/M2 | WEIGHT: 159 LBS | SYSTOLIC BLOOD PRESSURE: 119 MMHG | TEMPERATURE: 97.5 F | HEART RATE: 57 BPM | DIASTOLIC BLOOD PRESSURE: 80 MMHG

## 2022-07-27 DIAGNOSIS — R31.0 GROSS HEMATURIA: ICD-10-CM

## 2022-07-27 LAB
BILIRUB UR QL STRIP: NORMAL
CLARITY UR: CLEAR
COLLECTION METHOD: NORMAL
GLUCOSE UR-MCNC: NEGATIVE
HCG UR QL: 0.2 EU/DL
HGB UR QL STRIP.AUTO: NORMAL
KETONES UR-MCNC: NEGATIVE
LEUKOCYTE ESTERASE UR QL STRIP: NORMAL
NITRITE UR QL STRIP: NEGATIVE
PH UR STRIP: 7
PROT UR STRIP-MCNC: NEGATIVE
SP GR UR STRIP: 1

## 2022-07-27 PROCEDURE — 81003 URINALYSIS AUTO W/O SCOPE: CPT | Mod: QW

## 2022-07-27 PROCEDURE — 99203 OFFICE O/P NEW LOW 30 MIN: CPT

## 2022-07-27 NOTE — HISTORY OF PRESENT ILLNESS
[FreeTextEntry1] : Reason for Visit: Gross hematuria\par \par This is a 56 year year-year-old  woman with gross hematuria and a distant history of tobacco use.  She quit 16 years ago at the age of 40.  Was a moderate smoker prior to that.  She denies any history of kidney stones kidney tumors no history of bladder cancer.  She denies any recurrent urinary tract infections.  Denies any current fever or pain.\par \par . Patient is referred for evaluation of his condition.  Patient denies any gross hematuria or dysuria or urinary incontinence. The patient denies any aggravating or relieving factors. The patient denies any interference of function. The patient is entirely asymptomatic. All other review of systems are negative. Past medical history, family history and social history were inquired and were noncontributory to current condition. Patient [denies tobacco use]. Medications and allergies were reviewed.\par \par On examination, the patient is a healthy-appearing woman in no acute distress. She is alert and oriented follows commands. She has normal mood and affect. She is normocephalic. . \par \par Assessment: Gross hematuria \par \par PLAN: CT abd and pelvis, cysto, cytology and UA today \par I counseled the patient. I discussed the various etiologies of her  symptoms. []. Risks and alternatives were discussed. I answered the patient questions. The patient will follow-up as directed and will contact me with any questions or concerns. Thank you for the opportunity to participate in the care of this patient. I'll keep you updated on his progress.\par \par Plan: _____. Followup in _____\par

## 2022-08-02 ENCOUNTER — NON-APPOINTMENT (OUTPATIENT)
Age: 57
End: 2022-08-02

## 2022-08-02 LAB — URINE CYTOLOGY: NORMAL

## 2022-09-10 ENCOUNTER — OUTPATIENT (OUTPATIENT)
Dept: OUTPATIENT SERVICES | Facility: HOSPITAL | Age: 57
LOS: 1 days | End: 2022-09-10
Payer: COMMERCIAL

## 2022-09-10 ENCOUNTER — APPOINTMENT (OUTPATIENT)
Dept: CT IMAGING | Facility: IMAGING CENTER | Age: 57
End: 2022-09-10

## 2022-09-10 DIAGNOSIS — R31.0 GROSS HEMATURIA: ICD-10-CM

## 2022-09-10 DIAGNOSIS — Z90.710 ACQUIRED ABSENCE OF BOTH CERVIX AND UTERUS: Chronic | ICD-10-CM

## 2022-09-10 PROCEDURE — 74178 CT ABD&PLV WO CNTR FLWD CNTR: CPT | Mod: 26

## 2022-09-10 PROCEDURE — 74178 CT ABD&PLV WO CNTR FLWD CNTR: CPT

## 2022-09-19 ENCOUNTER — APPOINTMENT (OUTPATIENT)
Dept: UROLOGY | Facility: CLINIC | Age: 57
End: 2022-09-19

## 2022-09-19 ENCOUNTER — OUTPATIENT (OUTPATIENT)
Dept: OUTPATIENT SERVICES | Facility: HOSPITAL | Age: 57
LOS: 1 days | End: 2022-09-19
Payer: COMMERCIAL

## 2022-09-19 VITALS — SYSTOLIC BLOOD PRESSURE: 121 MMHG | DIASTOLIC BLOOD PRESSURE: 77 MMHG

## 2022-09-19 DIAGNOSIS — R35.0 FREQUENCY OF MICTURITION: ICD-10-CM

## 2022-09-19 DIAGNOSIS — Z90.710 ACQUIRED ABSENCE OF BOTH CERVIX AND UTERUS: Chronic | ICD-10-CM

## 2022-09-19 PROCEDURE — 52000 CYSTOURETHROSCOPY: CPT

## 2022-09-29 DIAGNOSIS — R31.9 HEMATURIA, UNSPECIFIED: ICD-10-CM

## 2022-10-17 ENCOUNTER — APPOINTMENT (OUTPATIENT)
Dept: ORTHOPEDIC SURGERY | Facility: CLINIC | Age: 57
End: 2022-10-17

## 2022-10-17 VITALS
WEIGHT: 164 LBS | SYSTOLIC BLOOD PRESSURE: 144 MMHG | HEART RATE: 62 BPM | BODY MASS INDEX: 25.74 KG/M2 | HEIGHT: 67 IN | DIASTOLIC BLOOD PRESSURE: 82 MMHG

## 2022-10-17 DIAGNOSIS — M50.30 OTHER CERVICAL DISC DEGENERATION, UNSPECIFIED CERVICAL REGION: ICD-10-CM

## 2022-10-17 DIAGNOSIS — M51.36 OTHER INTERVERTEBRAL DISC DEGENERATION, LUMBAR REGION: ICD-10-CM

## 2022-10-17 PROCEDURE — 72040 X-RAY EXAM NECK SPINE 2-3 VW: CPT

## 2022-10-17 PROCEDURE — 99214 OFFICE O/P EST MOD 30 MIN: CPT

## 2022-10-17 PROCEDURE — 72100 X-RAY EXAM L-S SPINE 2/3 VWS: CPT

## 2022-10-18 ENCOUNTER — APPOINTMENT (OUTPATIENT)
Dept: ORTHOPEDIC SURGERY | Facility: CLINIC | Age: 57
End: 2022-10-18

## 2022-10-18 VITALS
WEIGHT: 164 LBS | DIASTOLIC BLOOD PRESSURE: 81 MMHG | OXYGEN SATURATION: 98 % | SYSTOLIC BLOOD PRESSURE: 122 MMHG | BODY MASS INDEX: 25.74 KG/M2 | HEIGHT: 67 IN | HEART RATE: 65 BPM

## 2022-10-18 DIAGNOSIS — M54.2 CERVICALGIA: ICD-10-CM

## 2022-10-18 PROCEDURE — 20553 NJX 1/MLT TRIGGER POINTS 3/>: CPT

## 2022-10-18 PROCEDURE — 99214 OFFICE O/P EST MOD 30 MIN: CPT | Mod: 25

## 2022-10-20 PROBLEM — M54.2 CERVICALGIA: Status: ACTIVE | Noted: 2022-10-17

## 2023-03-23 ENCOUNTER — APPOINTMENT (OUTPATIENT)
Dept: ORTHOPEDIC SURGERY | Facility: CLINIC | Age: 58
End: 2023-03-23
Payer: COMMERCIAL

## 2023-03-23 VITALS
WEIGHT: 160 LBS | TEMPERATURE: 97.2 F | BODY MASS INDEX: 25.11 KG/M2 | HEART RATE: 65 BPM | HEIGHT: 67 IN | SYSTOLIC BLOOD PRESSURE: 122 MMHG | DIASTOLIC BLOOD PRESSURE: 80 MMHG | OXYGEN SATURATION: 98 %

## 2023-03-23 DIAGNOSIS — M76.822 POSTERIOR TIBIAL TENDINITIS, LEFT LEG: ICD-10-CM

## 2023-03-23 PROCEDURE — 99213 OFFICE O/P EST LOW 20 MIN: CPT

## 2023-03-23 NOTE — DISCUSSION/SUMMARY
[de-identified] : Discussed findings of today's exam and possible causes of patient's pain.  Educated patient on their most probable diagnosis of left posterior tibial tendinitis.  Reviewed possible courses of treatment, and we collaboratively decided best course of treatment at this time will include conservative management.  Patient has not tried any treatment as of yet, recommend a short course of regular use of oral NSAIDs, recommend Aleve, 1–2 tabs orally twice a day with food consecutively for 5 days, then as needed thereafter.  Patient may proceed with low impact exercise.  If she has persisting pain would recommend a course of physical therapy at that time.  Patient will continue use of her orthotics.  Follow up as needed.  Patient appreciates and agrees with current plan.\par \par I work as part of an academic orthopedic group and routinely have a physician in training (resident / fellow) working with me.  Any part of the history and physical exam performed by the physician in training was either directly reviewed and/or replicated by myself.  Any procedure performed by the physician in training was performed under my direct supervision and with the consent of the patient.\par \par This note was generated using dragon medical dictation software.  A reasonable effort has been made for proofreading its contents, but typos may still remain.  If there are any questions or points of clarification needed please notify my office.\par

## 2023-03-23 NOTE — HISTORY OF PRESENT ILLNESS
[de-identified] : Patient is here for left ankle pain that began about 3 weeks ago. There was no inciting injury. This is the first evaluation for this issue. Patient reports constant pain. Patient has done nothing at this time to treat their pain. Denies N/T/R/Prior injury. 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 In addition, the patient's family and social history, which were noncontributory to this visit, were reviewed also. Intake form was reviewed. The patient has no family history of arthritis.

## 2023-03-23 NOTE — PHYSICAL EXAM
[de-identified] : Constitutional: Well-nourished, well-developed, No acute distress\par Respiratory:  Good respiratory effort, no SOB\par Lymphatic: No regional lymphadenopathy, no lymphedema\par Psychiatric: Pleasant and normal affect, alert and oriented x3\par Musculoskeletal: normal except where as noted in regional exam\par \par Left ankle:\par APPEARANCE: Pes planus bilaterally, no marked deformities, no swelling or malalignment\par POSITIVE TENDERNESS: Moderate over the medial tibia and posterior tibialis\par NONTENDER: anterior tibial shaft, fibula shaft, medial/lateral malleoli, anterior tibialis, peroneals, gastroc/achilles\par ROM: full & painless in the knee and ankle, no pain with rotation of the leg. \par RESISTIVE TESTING: Mild pain with resisted PF/Inversion, painless resisted knee flex/ext, Ankle DF/Eversion. \par SPECIAL TESTS: Mild pain with single-leg hop test, neg squeez test, neg calcaneal bump test\par

## 2025-05-27 ENCOUNTER — APPOINTMENT (OUTPATIENT)
Dept: ORTHOPEDIC SURGERY | Facility: CLINIC | Age: 60
End: 2025-05-27
Payer: COMMERCIAL

## 2025-05-27 VITALS — BODY MASS INDEX: 28.72 KG/M2 | HEIGHT: 67 IN | WEIGHT: 183 LBS

## 2025-05-27 DIAGNOSIS — M17.12 UNILATERAL PRIMARY OSTEOARTHRITIS, LEFT KNEE: ICD-10-CM

## 2025-05-27 PROCEDURE — 73564 X-RAY EXAM KNEE 4 OR MORE: CPT | Mod: LT

## 2025-05-27 PROCEDURE — 20610 DRAIN/INJ JOINT/BURSA W/O US: CPT | Mod: LT

## 2025-05-27 PROCEDURE — 99204 OFFICE O/P NEW MOD 45 MIN: CPT | Mod: 25

## 2025-05-27 RX ORDER — HYALURONATE SODIUM 20 MG/2 ML
20 SYRINGE (ML) INTRAARTICULAR
Qty: 1 | Refills: 0 | Status: ACTIVE | COMMUNITY
Start: 2025-05-27